# Patient Record
Sex: FEMALE | URBAN - METROPOLITAN AREA
[De-identification: names, ages, dates, MRNs, and addresses within clinical notes are randomized per-mention and may not be internally consistent; named-entity substitution may affect disease eponyms.]

---

## 2024-10-22 ENCOUNTER — DOCUMENTATION (OUTPATIENT)
Dept: GASTROENTEROLOGY | Facility: HOSPITAL | Age: 64
End: 2024-10-22
Payer: COMMERCIAL

## 2024-10-22 NOTE — PROGRESS NOTES
Dr. Anthony Willis's office received a referral for this patient from Dr. Bryan Adames for incomplete colonoscopy. Dr. Willis reviewed the referral on 10/22/2024. Per Dr. Willis, OK to schedule a colonoscopy for a 60 minute appointment at [facility].    Francine Mendez was notified to call and schedule this patient. Contact Radha Ramey RN at [phone] for any questions.      See below for supporting clinical information from the referral sent by Dr. Adames's office:    Refer to Dr. Willis  Reason/Goals: Incomplete colonoscopy. Bradycardia. Colonoscopy advanced to hepatic flexure. Looping in the sigmoid colon.    Colonoscopy 10/14/2024 performed by Dr. Adames:  Indications: family hx of colon cancer and screening  8 mm polyp in the transverse colon removed  Polyps (5 mm to 5 mm) in the descending colon removed.   1.5 cm polyp in the descending colon- 45 cm. (Polypectomy).  Moderate diverticulosis of the sigmoid colon.  The procedure was very difficult. Pressure was applied to the sigmoid colon to facilitate the advancement of the scope.   Complications: arrhythmia, bradycardia.

## 2024-12-12 ENCOUNTER — TELEPHONE (OUTPATIENT)
Dept: GASTROENTEROLOGY | Facility: CLINIC | Age: 64
End: 2024-12-12
Payer: COMMERCIAL